# Patient Record
Sex: MALE | Race: WHITE
[De-identification: names, ages, dates, MRNs, and addresses within clinical notes are randomized per-mention and may not be internally consistent; named-entity substitution may affect disease eponyms.]

---

## 2019-01-10 ENCOUNTER — HOSPITAL ENCOUNTER (EMERGENCY)
Dept: HOSPITAL 80 - FED | Age: 24
Discharge: HOME | End: 2019-01-10
Payer: COMMERCIAL

## 2019-01-10 VITALS — DIASTOLIC BLOOD PRESSURE: 71 MMHG | SYSTOLIC BLOOD PRESSURE: 135 MMHG

## 2019-01-10 DIAGNOSIS — R10.31: Primary | ICD-10-CM

## 2019-01-10 DIAGNOSIS — R19.7: ICD-10-CM

## 2019-01-10 DIAGNOSIS — E86.9: ICD-10-CM

## 2019-01-10 LAB — PLATELET # BLD: 210 10^3/UL (ref 150–400)

## 2019-01-10 NOTE — EDPHY
H & P


Time Seen by Provider: 01/10/19 10:47


HPI/ROS: 





Chief complaint.  Abdominal pain





HPI.  23-year-old male presents with 3 day history of vomiting and diarrhea.  

He has had hourly diarrhea without blood.  Occasional vomiting.  He had some 

generalized abdominal discomfort and now has right lower quadrant pain.  No bad 

food or known exposures.  No recent travel.  Denies urinary symptoms.  Penis 

and testicles are normal.  No chest pain, shortness of breath.  No fever.





ROS


10 systems were reviewed and negative with the exception of the elements 

mentioned in the history of present illness





Past Medical/Surgical History: 





Healthy


Social History: 





Single, nonsmoker, no alcohol


Smoking Status: Never smoked


Physical Exam: 





General Appearance:  Alert pleasant well-developed male mild distress vital 

signs are stable


Eyes: Pupils equal and round no pallor or injection.


ENT, mucous membranes are dry


Respiratory:  There are no retractions, lungs are clear to auscultation.


Cardiovascular: Regular rate and rhythm.


Gastrointestinal:  Abdomen is soft with tenderness in the right lower quadrant.

  Normal bowel sounds.  No masses


Neurological:  Awake and alert, sensory and motor exams grossly normal.


Skin: Warm and dry, no rashes.


Musculoskeletal:  Neck is supple nontender.


Extremities  symmetrical, full range of motion.


Psychiatric: Patient is oriented X 3, there is no agitation.


Constitutional: 


 Initial Vital Signs











Temperature (C)  36.8 C   01/10/19 10:43


 


Heart Rate  75   01/10/19 10:43


 


Respiratory Rate  17   01/10/19 10:43


 


Blood Pressure  133/72 H  01/10/19 10:43


 


O2 Sat (%)  96   01/10/19 10:43








 











O2 Delivery Mode               Room Air














Allergies/Adverse Reactions: 


 





? chicken pox vaccine Allergy (Intermediate, Uncoded 01/10/19 10:41)


 throat swelling


eggs Allergy (Intermediate, Uncoded 01/10/19 10:41)


 throat swelling


peanut Allergy (Intermediate, Uncoded 01/10/19 10:41)


 throat swelling


shellfish Allergy (Unknown, Uncoded 01/10/19 10:41)


 








Home Medications: 














 Medication  Instructions  Recorded


 


Albuterol Sulfate [Albuterol 1 - 2 puffs IH Q4H 07/19/14





Inhaler Hfa]  


 


Amoxicillin/Clavulanate Pot 875 mg PO BID #14 tab 01/10/19





[Augmentin 875 MG TAB (*)]  


 


Breo Ellipta 100-25 Mcg INH  01/10/19


 


VYVANSE  01/10/19














Medical Decision Making





- Diagnostics


Imaging Results: 


 Imaging Impressions





Abdomen CT  01/10/19 11:18


Impression: 


1. Minimally distended appendix with periappendiceal haziness and fat stranding 

is suggestive of early appendicitis.


2. Splenomegaly.


 


Findings and recommendations discussed with TALYA LORENZ CARIN  at 1237 hour, 1/10/

2019.











Procedures: 





IV normal saline with initial target of 2 L


ED Course/Re-evaluation: 





Patient and I discussed imaging and lab results.  We discussed concern for 

early appendicitis.





I consulted and discussed case with Dr. Uribe on-call for surgery who sees the 

patient in the emergency department.  Dr. Uribe and the patient discussed 

medical management versus surgery today.  The patient opts for medical 

management.





Patient and I discussed treatment plan including criteria for return and 

importance of follow-up and further evaluation.  He expresses understanding and 

agreement


Differential Diagnosis: 





Patient has had vomiting and diarrhea likely viral illness.  Now he has right 

lower quadrant pain with normal CBC.  The CT abdomen pelvis with IV contrast 

indicates early appendicitis.  Patient and surgeon decided that they would 

treat this medically.  Patient is encouraged to return for worsening pain or 

fever.  I have asked him to be rechecked in 2 days without fail.  He expresses 

understanding and agreement





- Data Points


Laboratory Results: 


 Laboratory Results





 01/10/19 11:00 





 01/10/19 11:00 





 











  01/10/19 01/10/19





  11:00 11:00


 


WBC    5.98 10^3/uL 10^3/uL





    (3.80-9.50) 


 


RBC    5.72 10^6/uL 10^6/uL





    (4.40-6.38) 


 


Hgb    17.2 g/dL g/dL





    (13.7-17.5) 


 


Hct    49.8 % %





    (40.0-51.0) 


 


MCV    87.1 fL fL





    (81.5-99.8) 


 


MCH    30.1 pg pg





    (27.9-34.1) 


 


MCHC    34.5 g/dL g/dL





    (32.4-36.7) 


 


RDW    12.2 % %





    (11.5-15.2) 


 


Plt Count    210 10^3/uL 10^3/uL





    (150-400) 


 


MPV    8.6 fL L fL





    (8.7-11.7) 


 


Neut % (Auto)    66.2 % %





    (39.3-74.2) 


 


Lymph % (Auto)    21.6 % %





    (15.0-45.0) 


 


Mono % (Auto)    11.4 % %





    (4.5-13.0) 


 


Eos % (Auto)    0.3 % L %





    (0.6-7.6) 


 


Baso % (Auto)    0.3 % %





    (0.3-1.7) 


 


Nucleat RBC Rel Count    0.0 % %





    (0.0-0.2) 


 


Absolute Neuts (auto)    3.96 10^3/uL 10^3/uL





    (1.70-6.50) 


 


Absolute Lymphs (auto)    1.29 10^3/uL 10^3/uL





    (1.00-3.00) 


 


Absolute Monos (auto)    0.68 10^3/uL 10^3/uL





    (0.30-0.80) 


 


Absolute Eos (auto)    0.02 10^3/uL L 10^3/uL





    (0.03-0.40) 


 


Absolute Basos (auto)    0.02 10^3/uL 10^3/uL





    (0.02-0.10) 


 


Absolute Nucleated RBC    0.00 10^3/uL 10^3/uL





    (0-0.01) 


 


Immature Gran %    0.2 % %





    (0.0-1.1) 


 


Immature Gran #    0.01 10^3/uL 10^3/uL





    (0.00-0.10) 


 


Sodium  138 mEq/L mEq/L  





   (135-145)  


 


Potassium  3.5 mEq/L mEq/L  





   (3.5-5.2)  


 


Chloride  104 mEq/L mEq/L  





   ()  


 


Carbon Dioxide  24 mEq/l mEq/l  





   (22-31)  


 


Anion Gap  10 mEq/L mEq/L  





   (6-14)  


 


BUN  9 mg/dL mg/dL  





   (7-23)  


 


Creatinine  0.8 mg/dL mg/dL  





   (0.7-1.3)  


 


Estimated GFR  > 60   





   


 


Glucose  82 mg/dL mg/dL  





   ()  


 


Calcium  9.3 mg/dL mg/dL  





   (8.5-10.4)  











Medications Given: 


 








Discontinued Medications





Sodium Chloride (Ns)  1,000 mls @ 0 mls/hr IV EDNOW ONE; Wide Open


   PRN Reason: Protocol


   Stop: 01/10/19 11:19


   Last Admin: 01/10/19 11:23 Dose:  1,000 mls


Sodium Chloride (Ns)  1,000 mls @ 0 mls/hr IV EDNOW ONE; Wide Open


   PRN Reason: Protocol


   Stop: 01/10/19 11:19


   Last Admin: 01/10/19 11:26 Dose:  1,000 mls








Departure





- Departure


Disposition: Home, Routine, Self-Care


Clinical Impression: 


Abdominal pain


Qualifiers:


 Abdominal location: right lower quadrant Qualified Code(s): R10.31 - Right 

lower quadrant pain





Condition: Good


Instructions:  Acute Abdominal Pain (ED)


Additional Instructions: 


Your CT today indicates possible early appendicitis.





The we will prescribe antibiotics for you to take.





  Return for worsening pain, fever, vomiting.





Frequent, small sips liquids while nauseated.  Gradual diet advancement.





Recheck in the emergency department in 2 days without fail


Referrals: 


NONE *PRIMARY CARE P,. [Primary Care Provider] - As per Instructions


Fitz Uribe MD [Medical Doctor] - As per Instructions


Prescriptions: 


Amoxicillin/Clavulanate Pot [Augmentin 875 MG TAB (*)] 875 mg PO BID #14 tab

## 2019-01-10 NOTE — GCON
REASON FOR CONSULTATION:  Right lower quadrant pain.



HISTORY:  The patient is a 23-year-old male, who works as a 
, and spends of his time in [place].  On Monday night, after having a burger 
for dinner, he had the onset of a generalized abdominal pain.  He had diarrhea 
that night, which recurred on Tuesday.  Tuesday night he was fine, and 
Wednesday night the diarrhea occurred.  He had vomiting starting on Tuesday 
morning, and he had pain at a level of 6 to 8 starting on Tuesday morning.  He 
is currently not hungry.  He has not been hungry since onset.  There is no 
history of recent upper respiratory tract infection, or diarrhea.  There is no 
travel outside the [place].  He has not had any antibiotics.  He has no 
history of inflammatory bowel disease.  He has had no prior surgery. 



A CT scan was performed in the ER, which showed a slight hyperemia to the 
appendiceal wall, a slight increase to periappendiceal smudging, but no 
distinct appendiceal enlargement. 



I was asked to evaluate him.



SOCIAL HISTORY:  He does not smoke.  He drinks approximately 2 drinks a week.



ALLERGIES:  He has had an allergic reaction to the flu vaccine in the past 
manifested by airway closure, and itchy eyes.  He currently is getting 
desensitization shots for allergies to peanuts, shellfish, dogs, cats, and 
trees.



MEDICATIONS:  

1.  He uses Breo 200 mg daily. 

2.  Albuterol inhaler.  

3.  He uses Vyvanse 30 mg every morning.



PAST SURGICAL HISTORY:  

1.  Tonsillectomy. 

2.  Gum surgery. 

3.  Circumcision.



REVIEW OF SYSTEMS:  There is no history of rheumatic fever, tuberculosis, 
hepatitis, or transfusions.  He has had 1 concussion.  He wears lenses for 
visual correction.  His asthma has been lifelong, it is triggered by exercise, 
allergies, and cold.  No limits on his activities normally.  He has had short 
courses of steroids around the time of his allergy shots.



PHYSICAL EXAMINATION:  GENERAL:  He is awake and alert.  

VITALS:  Blood pressure 135/79 with a heart rate of 72, room air saturation is 
92%, temperature 36.8.  

HEENT:  His skull is normocephalic, and atraumatic.  

NEUROLOGIC:  He is oriented to person, place, and time.  GCS is 15.  There are 
no focal lateralizing neurologic findings.  

NECK:  Shows no thyroid enlargement.  No carotid bruits.  

LYMPHATICS:  Shows no cervical, supraclavicular, axillary, or inguinal 
lymphadenopathy.  

BACK:  Unremarkable.  

LUNGS:  Clear to auscultation.  

CARDIAC:  Shows S1, S2 to be normal, with normal split of S2 without murmurs, 
rubs, or gallops.  

ABDOMEN:  There is no distinct tenderness with cough.  Bowel sounds are 
hypoactive.  There is no obturator, or psoas sign.  To palpation, left upper 
quadrant is 1 on a scale of 1 to 10, left midabdomen is 1, left lower quadrant 
is 2, epigastrium is 1, periumbilical region is 1, suprapubic area is 2, right 
upper quadrant is 1, right mid abdomen is 1, right lower quadrant is 2.



LABS:  His white count is 5.9 with 66% neutrophils.  Hematocrit is 50, platelet 
count is 210.



ASSESSMENT:  A long discussion is carried out about the pathophysiology of 
appendicitis.  I have indicated to him that he certainly is in a gray zone.  
This certainly could be a viral enteric process given the extended time frame, 
and the diarrhea at onset.  We have talked about treating him with antibiotics 
as opposed to surgical intervention.  He would like to try the antibiotic 
route.  He promises to return should he have any further mischief.  He will 
stick to a clear liquid diet.





Job #:  [id number]

MTDD

## 2019-01-12 ENCOUNTER — HOSPITAL ENCOUNTER (EMERGENCY)
Dept: HOSPITAL 80 - FED | Age: 24
Discharge: HOME | End: 2019-01-12
Payer: COMMERCIAL

## 2019-01-12 VITALS — DIASTOLIC BLOOD PRESSURE: 63 MMHG | SYSTOLIC BLOOD PRESSURE: 111 MMHG

## 2019-01-12 DIAGNOSIS — K52.9: Primary | ICD-10-CM

## 2019-01-12 NOTE — EDPHY
H & P


Stated Complaint: TOLD TO COME  FOR ABD RECHECK--FEELING BETTER


Time Seen by Provider: 01/12/19 11:36


HPI/ROS: 


HPI:  This is a 23-year-old male who presents with





Chief Complaint: TOLD TO COME  FOR ABD RECHECK--FEELING BETTER





Location:  Abdomen


Quality:  Recheck


Duration:  Several days


Signs and Symptoms: no fever, no nausea, no vomiting, no hematemesis, no blood 

in stool, no abdominal bloating, no diarrhea, no back pain, no urinary symptoms

, no testicular/groin pain, no indigestion, no chest pain, no shortness of 

breath


Timing:  Resolved


Severity:  Mild


Context:  Patient was seen in this emergency room on 01/10/2019 I Dr. Pérez 

and Dr. Uribe with complaints of onset of generalized abdominal pain, diarrhea 

Monday and Tuesday.  Wednesday the diarrhea reoccurred.  Any started vomiting 

on Tuesday morning.  CT abdomen and pelvis scan showed possible early 

appendicitis.  Evaluation by Dr. Uribe as patient was in the gray zone and it 

was decided to observe patient treated with Augmentin with follow-up in the 

emergency room today.  Had a bowel movement yesterday.  Patient reports that he 

was unclear with kids for the 1st day but has been eating bland foods all day 

yesterday evening and today without difficulty.  He does complain of some 

nausea after taking Augmentin.


Modifying Factors: 





Comment: 








ROS:  A comprehensive 10 system review of systems is otherwise negative aside 

from elements mentioned in the history of present illness. 





MEDICAL/SURGICAL/SOCIAL HISTORY: 


Medical history:  Asthma


Surgical history:  Denies


Social history:  Never smoked.  Family history noncontributory.








CONSTITUTIONAL:  Well-developed, well-nourished adult white male, awake and 

alert, no obvious distress


HEENT: Atraumatic and normocephalic, PERRL, EOMI.  Nares patent; no rhinorrhea;

  no nasal mucosal edema. Tympanic membranes clear. Oropharynx clear, no 

exudate and moist pink mucosa.  Airway patent.  No lymphadenopathy.  No 

meningismus.


Cardiovascular: Normal S1/S2, regular rate, regular rhythm, without murmur rub 

or gallop.


PULMONARY/CHEST:  Symmetrical and nontender. Clear to auscultation bilaterally. 

Good air movement. No accessory muscle usage.


ABDOMEN:  Soft, nondistended, nontender, no rebound, no guarding, no peritoneal 

signs, no masses or organomegaly. No CVAT.


EXTREMITIES:  2/2 pulses, strength 5/5, no deformities, no clubbing, no 

cyanosis or edema.


NEUROLOGICAL: no focal neuro deficits.  GCS 15.


SKIN: Warm and dry, no erythema. no rash.  Good capillary refill. 








Source: Patient


Exam Limitations: No limitations





- Medical/Surgical History


Hx Asthma: Yes


Hx Chronic Respiratory Disease: No


Hx Diabetes: No


Hx Cardiac Disease: No


Hx Renal Disease: No


Hx Cirrhosis: No


Hx Alcoholism: No


Hx HIV/AIDS: No


Hx Splenectomy or Spleen Trauma: No


Other PMH: denies





- Social History


Smoking Status: Never smoked


Constitutional: 





 Initial Vital Signs











Temperature (C)  36.4 C   01/12/19 11:24


 


Heart Rate  65   01/12/19 11:24


 


Respiratory Rate  16   01/12/19 11:24


 


Blood Pressure  111/63   01/12/19 11:24


 


O2 Sat (%)  97   01/12/19 11:24








 











O2 Delivery Mode               Room Air














Allergies/Adverse Reactions: 


 





? chicken pox vaccine Allergy (Intermediate, Uncoded 01/10/19 10:41)


 throat swelling


eggs Allergy (Intermediate, Uncoded 01/10/19 10:41)


 throat swelling


peanut Allergy (Intermediate, Uncoded 01/10/19 10:41)


 throat swelling


shellfish Allergy (Unknown, Uncoded 01/10/19 10:41)


 








Home Medications: 














 Medication  Instructions  Recorded


 


Albuterol Sulfate [Albuterol 1 - 2 puffs IH Q4H 07/19/14





Inhaler Hfa]  


 


Amoxicillin/Clavulanate Pot 875 mg PO BID #14 tab 01/10/19





[Augmentin 875 MG TAB (*)]  


 


Breo Ellipta 100-25 Mcg INH  01/10/19


 


VYVANSE  01/10/19














Medical Decision Making


ED Course/Re-evaluation: 


Vital signs reviewed and stable upon arrival.  No systemic signs.


Abdomen is soft and nontender and I doubt appendicitis at this time.


I have counseled patient to continue to take Augmentin and take it with food to 

decrease nausea secondary to medication side effect.








This patient was seen under the supervision of my secondary supervising 

physician.  I evaluated care for this patient independently.  Discussed this 

patient with Dr. Nagy who did not see the patient.  





Differential Diagnosis: 


Abdominal pain including but not limited to appendicitis, cholecystitis, 

gastritis and urinary tract infection.








Departure





- Departure


Disposition: Home, Routine, Self-Care


Clinical Impression: 


 Viral gastroenteritis, Soft abdomen





Condition: Good


Instructions:  Gastroenteritis (ED)


Additional Instructions: 


Continue to take Augmentin twice daily with food.


Consume a minimum of 8-10 glasses of water or electrolyte fluid replacement 

drinks that include Gatorade, Powerade, Pedialyte. 


Eat a bland diet for the next 48 hours and then slowly advance as tolerated. 


Return to the Emergency Room if symptoms do not resolve in the next 48-72 hours

, you spike a fever > 102  F, or experience intractable abdominal pain/nausea/

vomiting. 


Establish care with a primary care provider.  A referral to Dr. Nathan has 

been given.


Referrals: 


RUBÉN NATHAN [Non Staff Provider (MD)] - As per Instructions